# Patient Record
Sex: FEMALE | Race: WHITE | NOT HISPANIC OR LATINO | URBAN - METROPOLITAN AREA
[De-identification: names, ages, dates, MRNs, and addresses within clinical notes are randomized per-mention and may not be internally consistent; named-entity substitution may affect disease eponyms.]

---

## 2018-09-16 ENCOUNTER — EMERGENCY (EMERGENCY)
Facility: HOSPITAL | Age: 23
LOS: 1 days | Discharge: HOME | End: 2018-09-16
Admitting: EMERGENCY MEDICINE
Payer: COMMERCIAL

## 2018-09-16 VITALS
TEMPERATURE: 98 F | OXYGEN SATURATION: 99 % | HEIGHT: 60 IN | DIASTOLIC BLOOD PRESSURE: 87 MMHG | SYSTOLIC BLOOD PRESSURE: 147 MMHG | RESPIRATION RATE: 18 BRPM | WEIGHT: 151.9 LBS | HEART RATE: 100 BPM
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
## 2018-09-16 DIAGNOSIS — S61.012A LACERATION WITHOUT FOREIGN BODY OF LEFT THUMB WITHOUT DAMAGE TO NAIL, INITIAL ENCOUNTER: ICD-10-CM
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
## 2018-09-16 DIAGNOSIS — T81.33XA DISRUPTION OF TRAUMATIC INJURY WOUND REPAIR, INITIAL ENCOUNTER: ICD-10-CM

## 2018-09-16 DIAGNOSIS — Z23 ENCOUNTER FOR IMMUNIZATION: ICD-10-CM
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
## 2018-09-16 PROCEDURE — 90471 IMMUNIZATION ADMIN: CPT

## 2018-09-16 PROCEDURE — 99284 EMERGENCY DEPT VISIT MOD MDM: CPT

## 2018-09-16 PROCEDURE — 12041 INTMD RPR N-HF/GENIT 2.5CM/<: CPT

## 2018-09-16 PROCEDURE — 90715 TDAP VACCINE 7 YRS/> IM: CPT

## 2018-09-16 PROCEDURE — 96374 THER/PROPH/DIAG INJ IV PUSH: CPT | Mod: XU

## 2018-09-16 PROCEDURE — 99285 EMERGENCY DEPT VISIT HI MDM: CPT | Mod: 25

## 2018-09-16 RX ORDER — CEFAZOLIN SODIUM 1 G
2000 VIAL (EA) INJECTION ONCE
Qty: 0 | Refills: 0 | Status: COMPLETED | OUTPATIENT
Start: 2018-09-16 | End: 2018-09-16

## 2018-09-16 RX ORDER — TETANUS TOXOID, REDUCED DIPHTHERIA TOXOID AND ACELLULAR PERTUSSIS VACCINE, ADSORBED 5; 2.5; 8; 8; 2.5 [IU]/.5ML; [IU]/.5ML; UG/.5ML; UG/.5ML; UG/.5ML
0.5 SUSPENSION INTRAMUSCULAR ONCE
Qty: 0 | Refills: 0 | Status: COMPLETED | OUTPATIENT
Start: 2018-09-16 | End: 2018-09-16

## 2018-09-16 RX ORDER — CEFAZOLIN SODIUM 1 G
2000 VIAL (EA) INJECTION ONCE
Qty: 0 | Refills: 0 | Status: DISCONTINUED | OUTPATIENT
Start: 2018-09-16 | End: 2018-09-16

## 2018-09-16 RX ADMIN — TETANUS TOXOID, REDUCED DIPHTHERIA TOXOID AND ACELLULAR PERTUSSIS VACCINE, ADSORBED 0.5 MILLILITER(S): 5; 2.5; 8; 8; 2.5 SUSPENSION INTRAMUSCULAR at 13:12

## 2018-09-16 RX ADMIN — Medication 2000 MILLIGRAM(S): at 13:11

## 2018-09-16 NOTE — ED CLERICAL - NS ED CLERK NOTE PRE-ARRIVAL INFORMATION; ADDITIONAL PRE-ARRIVAL INFORMATION
22 Y/O F TJ HUNT BEING SENT IN BY DR SANDEE CIFUENTES FROM [facility] FOR LEFT THUMB LACERATION AND HUMB WEAKNESS

## 2018-09-16 NOTE — ED PROVIDER NOTE - MEDICAL DECISION MAKING DETAILS
PT TO ED CO LACERATION TO TENDON LACERATION TO THUMB WITH KNIFE DR BUSBY HAND REPAIRED TENDON AND LACERATION WILL DC ON ABS AND TET GIVEN I have discussed the discharge plan with the patient. The patient agrees with the plan, as discussed.  The patient understands Emergency Department diagnosis is a preliminary diagnosis often based on limited information and that the patient must adhere to the follow-up plan as discussed.  The patient understands that if the symptoms worsen or if prescribed medications do not have the desired/planned effect that the patient may return to the Emergency Department at any time for further evaluation and treatment.

## 2018-09-16 NOTE — CONSULT NOTE ADULT - ASSESSMENT
IMP- complete laceration LT EPL tendon    Plan-  repair complete laceration LT EPL tendon  splint    PO ABX  pain control  F/U 1 week

## 2018-09-16 NOTE — ED ADULT NURSE NOTE - OBJECTIVE STATEMENT
23y F, A&ox3, presents to ed for laceration to left thumb when cutting bagel. sent in from . reports "I think my last tetanus was last 5 to 10 years." pressure applied, mild bleeding to site. Noted approximately 2.5cm laceration to thumb. no numbness nor tingling. Will continue to [facility]ior.

## 2018-09-16 NOTE — CONSULT NOTE ADULT - SUBJECTIVE AND OBJECTIVE BOX
23 year old RHD female who cut LT I with knife while cutting a bagel.  Patient went to urgent care who referred to [facility].  Noted by PA to have complete laceration of extensor.  Patient complains of pain with motion, bleeding and deformity.    ROS- none  PMH- none  PSH- breast infection  MEDS- none  NKA, seasonal    PE- system specific, LT I  2.5 cm open wound oblique over dorsal proximal phalanx  inability to extend IPJ, with pain  mild tenderness  mild edema  minimal echhymosis

## 2018-09-16 NOTE — ED PROVIDER NOTE - OBJECTIVE STATEMENT
22 yo right handed female with no pmhx presents with a laceration to her left thumb. She was cutting a bagel this morning when her knife slipped and cut the extensor surface of her left thumb. She presented to an urgent care earlier this morning. On exam at the urgent care, she was unable to fully extend her thumb, prompting them to send her to the ED due to concern for tendon involvement. States she is experiencing a lot of pain, which is exacerbated by flexion and extension. Patient states her last tetanus shot was within the last 10 years but is not sure exact date of vaccination. Denies numbness in the effected finger. Denies n/v/f/c/d/cp, sob, body aches.